# Patient Record
(demographics unavailable — no encounter records)

---

## 2024-12-05 NOTE — HISTORY OF PRESENT ILLNESS
[FreeTextEntry1] : Pt is a 26 year G0 LMP 11/24 presenting today for annual exam.   Pt without any concerns at this time. Has h/o PCOS & states her symptoms have been stable on OCPs, spironolactone, & metformin. Pt states she tried to increase her metformin dose per Dr. Reno to 750mg BID but was unable to tolerate it so she has only been taking 500mg QD.  Denies excessive acne or hair growth at this time. Denies breast pain, abnormal nipple discharge, abdominal pain, abnormal uterine bleeding, vaginal discharge/irritation, dysuria, dyspareunia. Pt is not yet sexually active but is considering having sex with a new partner. Pt feels safe & supported in her relationship. Pt reports regular exercise. Pt reports balanced diet.  PHQ9: 8, pt reports intermittent binge eating & feeling slow/fatigued   OB: denies Gyn: h/o PCOS, denies abnormal pap smears, fibroids, endometriosis, ovarian cysts, STIs PMH: vitiligo PSH: denies FMHx: denies family h/o breast, ovarian, uterine, or colon cancer Meds: metformin 500mg QD, mary OCP, spironolcatone 50mg QD Allx: NKDA Social: denies alcohol use, denies tobacco use, denies drug use

## 2024-12-05 NOTE — PLAN
[FreeTextEntry1] : #Well person exam -Medical/surgical/family history reviewed -Allergies and medications reconciled -pap deferred as pt is not yet sexually active  #h/o PCOS -prescriptions for OCP, metformin, & spironolactone refilled -pt to try increasing metformin to 500mg BID (from 500mg QD)  #mild depression -pt reports binge eating -pt follows with therapist -recommend pt f/u w/ psychiatrist & nutritionist--states she has an appt in January with nutritionist  All questions/concerns of pt addressed to their satisfaction.   F/u in 1 yr or sooner PRN.

## 2024-12-05 NOTE — PHYSICAL EXAM
[Appropriately responsive] : appropriately responsive [Alert] : alert [No Acute Distress] : no acute distress [Soft] : soft [Non-tender] : non-tender [Non-distended] : non-distended [No Lesions] : no lesions [No Mass] : no mass [Oriented x3] : oriented x3 [Examination Of The Breasts] : a normal appearance [No Masses] : no breast masses were palpable [Labia Majora] : normal [Labia Minora] : normal [Normal] :  normal [FreeTextEntry1] : internal exam deferred as pt is virginal & w/o complaints